# Patient Record
Sex: FEMALE | Race: WHITE
[De-identification: names, ages, dates, MRNs, and addresses within clinical notes are randomized per-mention and may not be internally consistent; named-entity substitution may affect disease eponyms.]

---

## 2020-02-17 ENCOUNTER — HOSPITAL ENCOUNTER (EMERGENCY)
Dept: HOSPITAL 25 - UCCORT | Age: 40
Discharge: HOME | End: 2020-02-17
Payer: COMMERCIAL

## 2020-02-17 VITALS — DIASTOLIC BLOOD PRESSURE: 75 MMHG | SYSTOLIC BLOOD PRESSURE: 116 MMHG

## 2020-02-17 DIAGNOSIS — Z79.890: ICD-10-CM

## 2020-02-17 DIAGNOSIS — J45.909: Primary | ICD-10-CM

## 2020-02-17 DIAGNOSIS — E03.9: ICD-10-CM

## 2020-02-17 DIAGNOSIS — F17.210: ICD-10-CM

## 2020-02-17 DIAGNOSIS — J02.9: ICD-10-CM

## 2020-02-17 DIAGNOSIS — Z88.1: ICD-10-CM

## 2020-02-17 DIAGNOSIS — Z79.82: ICD-10-CM

## 2020-02-17 DIAGNOSIS — Z88.0: ICD-10-CM

## 2020-02-17 DIAGNOSIS — Z88.8: ICD-10-CM

## 2020-02-17 PROCEDURE — G0463 HOSPITAL OUTPT CLINIC VISIT: HCPCS

## 2020-02-17 PROCEDURE — 99212 OFFICE O/P EST SF 10 MIN: CPT

## 2020-02-17 NOTE — UC
Respiratory Complaint HPI





- HPI Summary


HPI Summary: 


39-year-old woman comes in with a chief complaint of 10 days of upper 

respiratory tract infection symptoms.  She's had rhinorrhea sinus pressure 

cough chest congestion wheezing fatigue.  She's been using her albuterol 

inhaler that does help some with the wheezing.





- History of Current Complaint


Chief Complaint: UCGeneralIllness


Stated Complaint: CONGESTION, EAR COMPLAINT


Time Seen by Provider: 02/17/20 17:27


Pain Intensity: 6





- Allergies/Home Medications


Allergies/Adverse Reactions: 


 Allergies











Allergy/AdvReac Type Severity Reaction Status Date / Time


 


amoxicillin [From Augmentin] Allergy  GI Upset Verified 02/17/20 17:07


 


ciprofloxacin Allergy  Anaphylatic Verified 02/17/20 17:07





   Shock  


 


clavulanic acid Allergy  GI Upset Verified 02/17/20 17:07





[From Augmentin]     


 


ondansetron [From Zofran] Allergy  Anaphylatic Verified 02/17/20 17:07





   Shock  


 


Penicillins Allergy  Anaphylatic Verified 02/17/20 17:07





   Shock  











Home Medications: 


 Home Medications





Albuterol HFA INHALER* [Ventolin HFA Inhaler*] 2 puff INH Q4H PRN 02/17/20 [

History Confirmed 02/17/20]


Aspirin/Acetaminophen/Caffeine [Excedrin Migraine Caplet] 2 tab PO ONCE 02/17/ 20 [History Confirmed 02/17/20]


Cetirizine* [ZyrTEC 10 MG TAB*] 1 tab PO DAILY 02/17/20 [History Confirmed 02/17 /20]


Levothyroxine TAB* [Synthroid TAB*] 50 mcg PO QAM 02/17/20 [History Confirmed 02 /17/20]


Methocarbamol TAB* [Robaxin 500 MG TAB*] 1 tab PO QPM 02/17/20 [History 

Confirmed 02/17/20]


estradioL [Estradiol] 2 mg PO DAILY 02/17/20 [History Confirmed 02/17/20]


guaiFENesin [Mucinex] 1 dose PO ONCE 02/17/20 [History Confirmed 02/17/20]


traMADol TAB* [Ultram*] 1 tab PO BID 02/17/20 [History Confirmed 02/17/20]











PMH/Surg Hx/FS Hx/Imm Hx


Previously Healthy: Yes


Endocrine History: Hypothyroidism


Respiratory History: Asthma





- Surgical History


Surgical History: Yes


Surgery Procedure, Year, and Place: hysterectomy.  L shoulder.  ovarian cyst 

removal.  exploratory lapartomy - dx'd with endometriosis.  cholecystectomy.  

bilateral oophrectomy





- Family History


Known Family History: Positive: Non-Contributory





- Social History


Alcohol Use: None


Substance Use Type: None


Smoking Status (MU): Heavy Every Day Tobacco Smoker


Amount Used/How Often: 1/2 ppd





Review of Systems


All Other Systems Reviewed And Are Negative: Yes


Constitutional: Positive: Other - SEE HPI


Skin: Positive: Negative


Eyes: Positive: Negative


ENT: Positive: Sore Throat, Nasal Discharge, Sinus Congestion


Respiratory: Positive: Cough, Other - SEE HPI


Cardiovascular: Positive: Negative


Gastrointestinal: Positive: Negative


Motor: Positive: Negative


Neurovascular: Positive: Negative


Musculoskeletal: Positive: Negative


Neurological/Mental Status: Positive: Negative


Psychological: Positive: Negative


Is Patient Immunocompromised?: No





Physical Exam


Triage Information Reviewed: Yes


Appearance: No Pain Distress, Well-Nourished, Ill-Appearing - MILD


Vital Signs: 


 Initial Vital Signs











Temp  97.5 F   02/17/20 17:09


 


Pulse  93   02/17/20 17:09


 


Resp  15   02/17/20 17:09


 


BP  116/75   02/17/20 17:09


 


Pulse Ox  98   02/17/20 17:09











Vital Signs Reviewed: Yes


Eye Exam: Normal


Eyes: Positive: Conjunctiva Clear


ENT: Positive: Pharyngeal erythema, Nasal congestion, Nasal drainage, TMs normal


Neck: Positive: Supple, Nontender


Respiratory: Positive: No respiratory distress, Rhonchi


Cardiovascular: Positive: RRR


Musculoskeletal: Positive: Strength Intact, ROM Intact


Neurological: Positive: Alert, Muscle Tone Normal


Psychological: Positive: Normal Response To Family, Age Appropriate Behavior


Skin Exam: Normal





Respiratory Course/Dx





- Differential Dx/Diagnosis


Provider Diagnosis: 


 Bronchitis, Asthma








Discharge ED





- Sign-Out/Discharge


Documenting (check all that apply): Patient Departure


All imaging exams completed and their final reports reviewed: No Studies





- Discharge Plan


Condition: Stable


Disposition: HOME


Prescriptions: 


Azithromyxin BECKY (NF) [Z-Becky (Zithromax) 250 mg tabs #6] 2 tab PO .TODAY, THEN 

1 DAILY #6 tab


guaiFENesin/CODIENE 100mg/10mg [Robitussin AC 100Mg/10Mg in 5 ml] 10 ml PO Q4H 

PRN #180 ml MDD 60ML


 PRN Reason: Cough


methylPREDNISolone [Medrol Dosepak 4 MG*] 0 mg PO .SEE BECKY INSTRUCTION #1 becky


Patient Education Materials:  Asthma (ED), Acute Bronchitis (ED)


Forms:  *Work Release


Referrals: 


JORJE Bolaños PA [Primary Care Provider] - 


Additional Instructions: 


FOLLOW UP WITH YOUR DOCTOR IF NOT COMPLETELY IMPROVED.


GET REEVALUATED SOONER IF NOT IMPROVED OR WORSE OR ANY QUESTIONS OR CONCERNS.








- Billing Disposition and Condition


Condition: STABLE


Disposition: Home